# Patient Record
Sex: MALE | NOT HISPANIC OR LATINO | URBAN - METROPOLITAN AREA
[De-identification: names, ages, dates, MRNs, and addresses within clinical notes are randomized per-mention and may not be internally consistent; named-entity substitution may affect disease eponyms.]

---

## 2023-05-09 ENCOUNTER — CLAIMS CREATED FROM THE CLAIM WINDOW (OUTPATIENT)
Dept: URBAN - METROPOLITAN AREA CLINIC 33 | Facility: CLINIC | Age: 48
End: 2023-05-09
Payer: COMMERCIAL

## 2023-05-09 ENCOUNTER — LAB OUTSIDE AN ENCOUNTER (OUTPATIENT)
Dept: URBAN - METROPOLITAN AREA CLINIC 33 | Facility: CLINIC | Age: 48
End: 2023-05-09

## 2023-05-09 VITALS
HEIGHT: 78 IN | BODY MASS INDEX: 36.45 KG/M2 | DIASTOLIC BLOOD PRESSURE: 92 MMHG | SYSTOLIC BLOOD PRESSURE: 160 MMHG | WEIGHT: 315 LBS | HEART RATE: 75 BPM | OXYGEN SATURATION: 95 %

## 2023-05-09 DIAGNOSIS — R10.13 EPIGASTRIC PAIN: ICD-10-CM

## 2023-05-09 PROCEDURE — 99204 OFFICE O/P NEW MOD 45 MIN: CPT | Performed by: STUDENT IN AN ORGANIZED HEALTH CARE EDUCATION/TRAINING PROGRAM

## 2023-05-09 RX ORDER — BUPRENORPHINE 7.5 UG/H
1 PATCH TO SKIN PATCH, EXTENDED RELEASE TRANSDERMAL
Status: ACTIVE | COMMUNITY

## 2023-05-09 RX ORDER — CLONAZEPAM 0.5 MG/1
1 TABLET AT BEDTIME TABLET ORAL ONCE A DAY
Status: ACTIVE | COMMUNITY

## 2023-05-09 RX ORDER — OMEGA-3/DHA/EPA/FISH OIL 60 MG-90MG
1 CAPSULE CAPSULE ORAL TWICE A DAY
Status: ACTIVE | COMMUNITY

## 2023-05-09 RX ORDER — PANTOPRAZOLE SODIUM 40 MG/1
1 TABLET TABLET, DELAYED RELEASE ORAL ONCE A DAY
Status: ACTIVE | COMMUNITY

## 2023-05-09 NOTE — HPI-TODAY'S VISIT:
48-year-old male hx cholecystectomy (2007), hx opiate abuse (on buprenoprhine),  presents for ER follow-up for abdominal pain.  He reports symptom onset around a month ago, started developing abdominal pain, nausea, localized pain primarily epigastric. Was taking 800 mg ibuprofen TID for severe tooth pain. He was seen at [facility] on 4-.  Abdominal pain gradually started subsiding after stopped NSAIDs. He was taking PPI briefly but stopped now that pain is gone, he is concerned about possible s/e. No bleeding symptoms. No nausea/emesis. Appetite is good. No heartburn. No dysphagia. No bowel habit changes. No prior crc screening. No family hx colon cancer.  He would like to know if NSAID can be safely resumed in future given hx pain symtoms, as tylenol less effective. Discussed colon cancer screening  recommended, and discussed options including colonoscopy and cologuard. He prefers colonsocopy but he prefers to hold on this for now but willing to readdress on follow up.  Available labs/imaging/procedures reviewed:  Labs 4- WBC 8.4 Hemoglobin 12.3 Platelets 203 BUN 16 Creatinine 1.9 AST 21 ALT 36 Total bili 0.5 Alkaline phosphatase 109 Lipase 10

## 2023-05-17 ENCOUNTER — OFFICE VISIT (OUTPATIENT)
Dept: URBAN - METROPOLITAN AREA CLINIC 98 | Facility: CLINIC | Age: 48
End: 2023-05-17

## 2023-05-31 ENCOUNTER — CLAIMS CREATED FROM THE CLAIM WINDOW (OUTPATIENT)
Dept: URBAN - METROPOLITAN AREA CLINIC 4 | Facility: CLINIC | Age: 48
End: 2023-05-31
Payer: COMMERCIAL

## 2023-05-31 ENCOUNTER — OFFICE VISIT (OUTPATIENT)
Dept: URBAN - METROPOLITAN AREA SURGERY CENTER 8 | Facility: SURGERY CENTER | Age: 48
End: 2023-05-31
Payer: COMMERCIAL

## 2023-05-31 DIAGNOSIS — K29.70 GASTRITIS, UNSPECIFIED, WITHOUT BLEEDING: ICD-10-CM

## 2023-05-31 DIAGNOSIS — R10.13 ABDOMINAL DISCOMFORT, EPIGASTRIC: ICD-10-CM

## 2023-05-31 DIAGNOSIS — K29.70 CHRONIC ACITVE GASTRITIS (H.PYLORI NEGATIVE): ICD-10-CM

## 2023-05-31 DIAGNOSIS — Q39.8 CONGENITAL DUPLICATION CYST OF ESOPHAGUS: ICD-10-CM

## 2023-05-31 PROBLEM — 235651006: Status: ACTIVE | Noted: 2023-05-31

## 2023-05-31 PROCEDURE — 43239 EGD BIOPSY SINGLE/MULTIPLE: CPT | Performed by: STUDENT IN AN ORGANIZED HEALTH CARE EDUCATION/TRAINING PROGRAM

## 2023-05-31 PROCEDURE — 88312 SPECIAL STAINS GROUP 1: CPT | Performed by: PATHOLOGY

## 2023-05-31 PROCEDURE — G8907 PT DOC NO EVENTS ON DISCHARG: HCPCS | Performed by: STUDENT IN AN ORGANIZED HEALTH CARE EDUCATION/TRAINING PROGRAM

## 2023-05-31 PROCEDURE — 88305 TISSUE EXAM BY PATHOLOGIST: CPT | Performed by: PATHOLOGY

## 2023-05-31 RX ORDER — CLONAZEPAM 0.5 MG/1
1 TABLET AT BEDTIME TABLET ORAL ONCE A DAY
Status: ACTIVE | COMMUNITY

## 2023-05-31 RX ORDER — PANTOPRAZOLE SODIUM 40 MG/1
1 TABLET TABLET, DELAYED RELEASE ORAL ONCE A DAY
Qty: 30 | Refills: 11 | OUTPATIENT
Start: 2023-05-31

## 2023-05-31 RX ORDER — BUPRENORPHINE 7.5 UG/H
1 PATCH TO SKIN PATCH, EXTENDED RELEASE TRANSDERMAL
Status: ACTIVE | COMMUNITY

## 2023-05-31 RX ORDER — PANTOPRAZOLE SODIUM 40 MG/1
1 TABLET TABLET, DELAYED RELEASE ORAL ONCE A DAY
Status: ACTIVE | COMMUNITY

## 2023-05-31 RX ORDER — OMEGA-3/DHA/EPA/FISH OIL 60 MG-90MG
1 CAPSULE CAPSULE ORAL TWICE A DAY
Status: ACTIVE | COMMUNITY

## 2023-08-08 ENCOUNTER — OFFICE VISIT (OUTPATIENT)
Dept: URBAN - METROPOLITAN AREA CLINIC 31 | Facility: CLINIC | Age: 48
End: 2023-08-08

## 2023-08-11 ENCOUNTER — OFFICE VISIT (OUTPATIENT)
Dept: URBAN - METROPOLITAN AREA CLINIC 33 | Facility: CLINIC | Age: 48
End: 2023-08-11

## 2023-09-05 ENCOUNTER — OFFICE VISIT (OUTPATIENT)
Dept: URBAN - METROPOLITAN AREA CLINIC 31 | Facility: CLINIC | Age: 48
End: 2023-09-05

## 2023-09-15 ENCOUNTER — DASHBOARD ENCOUNTERS (OUTPATIENT)
Age: 48
End: 2023-09-15

## 2023-09-15 ENCOUNTER — LAB OUTSIDE AN ENCOUNTER (OUTPATIENT)
Dept: URBAN - METROPOLITAN AREA CLINIC 33 | Facility: CLINIC | Age: 48
End: 2023-09-15

## 2023-09-15 ENCOUNTER — OFFICE VISIT (OUTPATIENT)
Dept: URBAN - METROPOLITAN AREA CLINIC 33 | Facility: CLINIC | Age: 48
End: 2023-09-15
Payer: COMMERCIAL

## 2023-09-15 VITALS
SYSTOLIC BLOOD PRESSURE: 140 MMHG | HEIGHT: 78 IN | HEART RATE: 81 BPM | DIASTOLIC BLOOD PRESSURE: 80 MMHG | WEIGHT: 304.4 LBS | BODY MASS INDEX: 35.22 KG/M2 | OXYGEN SATURATION: 95 %

## 2023-09-15 DIAGNOSIS — K29.70 GASTRITIS, PRESENCE OF BLEEDING UNSPECIFIED, UNSPECIFIED CHRONICITY, UNSPECIFIED GASTRITIS TYPE: ICD-10-CM

## 2023-09-15 DIAGNOSIS — Z12.11 COLON CANCER SCREENING: ICD-10-CM

## 2023-09-15 PROBLEM — 4556007: Status: ACTIVE | Noted: 2023-09-15

## 2023-09-15 PROCEDURE — 99213 OFFICE O/P EST LOW 20 MIN: CPT | Performed by: STUDENT IN AN ORGANIZED HEALTH CARE EDUCATION/TRAINING PROGRAM

## 2023-09-15 RX ORDER — BUPRENORPHINE HCL 8 MG/1
4 TABLETS TABLET SUBLINGUAL ONCE A DAY
Status: ACTIVE | COMMUNITY

## 2023-09-15 RX ORDER — PANTOPRAZOLE SODIUM 40 MG/1
1 TABLET TABLET, DELAYED RELEASE ORAL ONCE A DAY
Qty: 30 | Refills: 11 | Status: ON HOLD | COMMUNITY
Start: 2023-05-31

## 2023-09-15 RX ORDER — OMEGA-3/DHA/EPA/FISH OIL 60 MG-90MG
1 CAPSULE CAPSULE ORAL TWICE A DAY
Status: ON HOLD | COMMUNITY

## 2023-09-15 RX ORDER — SOD SULF/POT CHLORIDE/MAG SULF 1.479 G
12 TABLETS TABLET ORAL
Qty: 24 | Refills: 0 | OUTPATIENT
Start: 2023-09-15 | End: 2023-09-16

## 2023-09-15 RX ORDER — CLONAZEPAM 0.5 MG/1
2 TABLETS AT BEDTIME TABLET ORAL ONCE A DAY
Status: ACTIVE | COMMUNITY

## 2023-09-15 NOTE — HPI-TODAY'S VISIT:
48 Year old male patient hx cholecystectomy, chronic pain, NSAID usage, present today for Endoscopy follow up for epigastric pain symptoms. Findings as below. He is also age appropriate for CRC screening. Reports took pantoprazole for 1 month post procedure. Reports nausea intermittently on pantoprazole, abdominal discomfort. Side effects improved over time. Does admit took NSAID couple times due to dental pain he reports only thing that works. he is pending oral surgery at some point but has held off. He is interest in completing colonsocopy before end of year.    Endoscopy Report Ectopic gastric mucosa in the proximal esophagus.  Biopsied. - Normal mid esophagus and distal esophagus. - Two gastric erosions possible representing partially healed ulcers.  Biopsied. - Normal cardia, gastric fundus and gastric body. - Normal duodenal bulb, first portion of the duodenum and second portion of the duodenum.  Pathology Report Stomach, Body, Biopsy: CHRONIC GASTRITIS, NON-SPECIFIC.  No Evidence of H. Pylori Organisms or Intestinal Metaplasia.  Negative for Dysplasia or Malignancy. (B) Esophagus, Upper-Third, Biopsy: GASTRIC HETEROTOPIA / INLET PATCH.  No Evidence of Easton's Esophagus or Eosinophilic Esophagitis.  Negative for Infectious organisms, Dysplasia or Malignancy.

## 2023-11-09 ENCOUNTER — OFFICE VISIT (OUTPATIENT)
Dept: URBAN - METROPOLITAN AREA SURGERY CENTER 8 | Facility: SURGERY CENTER | Age: 48
End: 2023-11-09

## 2023-12-08 ENCOUNTER — OFFICE VISIT (OUTPATIENT)
Dept: URBAN - METROPOLITAN AREA CLINIC 33 | Facility: CLINIC | Age: 48
End: 2023-12-08

## 2024-07-15 ENCOUNTER — OFFICE VISIT (OUTPATIENT)
Dept: URBAN - METROPOLITAN AREA SURGERY CENTER 8 | Facility: SURGERY CENTER | Age: 49
End: 2024-07-15

## 2024-11-08 ENCOUNTER — OFFICE VISIT (OUTPATIENT)
Dept: URBAN - METROPOLITAN AREA CLINIC 33 | Facility: CLINIC | Age: 49
End: 2024-11-08

## 2024-11-13 ENCOUNTER — OFFICE VISIT (OUTPATIENT)
Dept: URBAN - METROPOLITAN AREA CLINIC 35 | Facility: CLINIC | Age: 49
End: 2024-11-13

## 2024-11-26 ENCOUNTER — TELEPHONE ENCOUNTER (OUTPATIENT)
Dept: URBAN - METROPOLITAN AREA CLINIC 35 | Facility: CLINIC | Age: 49
End: 2024-11-26

## 2024-11-26 ENCOUNTER — OFFICE VISIT (OUTPATIENT)
Dept: URBAN - METROPOLITAN AREA CLINIC 31 | Facility: CLINIC | Age: 49
End: 2024-11-26

## 2024-11-26 RX ORDER — BUPRENORPHINE HCL 8 MG/1
4 TABLETS TABLET SUBLINGUAL ONCE A DAY
Status: ACTIVE | COMMUNITY

## 2024-11-26 RX ORDER — PANTOPRAZOLE SODIUM 40 MG/1
1 TABLET TABLET, DELAYED RELEASE ORAL ONCE A DAY
Qty: 30 | Refills: 11 | Status: ON HOLD | COMMUNITY
Start: 2023-05-31

## 2024-11-26 RX ORDER — OMEGA-3/DHA/EPA/FISH OIL 60 MG-90MG
1 CAPSULE CAPSULE ORAL TWICE A DAY
Status: ON HOLD | COMMUNITY

## 2024-11-26 RX ORDER — CLONAZEPAM 0.5 MG/1
2 TABLETS AT BEDTIME TABLET ORAL ONCE A DAY
Status: ACTIVE | COMMUNITY

## 2024-11-26 NOTE — HPI-TODAY'S VISIT:
49 year old male patient presents for follow up to rediscuss colon cancer screening, previously ordered last vist. Hx cholecystectomy, chronic pain, nsaid usage hx epigastric pain previously, with findings below.   Last Visit(09/15/2023) 48 Year old male patient hx cholecystectomy, chronic pain, NSAID usage, present today for Endoscopy follow up for epigastric pain symptoms. Findings as below. He is also age appropriate for CRC screening. Reports took pantoprazole for 1 month post procedure. Reports nausea intermittently on pantoprazole, abdominal discomfort. Side effects improved over time. Does admit took NSAID couple times due to dental pain he reports only thing that works. he is pending oral surgery at some point but has held off. He is interest in completing colonsocopy before end of year.   Endoscopy Report Ectopic gastric mucosa in the proximal esophagus.  Biopsied. - Normal mid esophagus and distal esophagus. - Two gastric erosions possible representing partially healed ulcers.  Biopsied. - Normal cardia, gastric fundus and gastric body. - Normal duodenal bulb, first portion of the duodenum and second portion of the duodenum.  Pathology Report Stomach, Body, Biopsy: CHRONIC GASTRITIS, NON-SPECIFIC.  No Evidence of H. Pylori Organisms or Intestinal Metaplasia.  Negative for Dysplasia or Malignancy. (B) Esophagus, Upper-Third, Biopsy: GASTRIC HETEROTOPIA / INLET PATCH.  No Evidence of Easton's Esophagus or Eosinophilic Esophagitis.  Negative for Infectious organisms, Dysplasia or Malignancy.

## 2024-11-27 ENCOUNTER — OFFICE VISIT (OUTPATIENT)
Dept: URBAN - METROPOLITAN AREA CLINIC 35 | Facility: CLINIC | Age: 49
End: 2024-11-27

## 2024-12-23 ENCOUNTER — OFFICE VISIT (OUTPATIENT)
Dept: URBAN - METROPOLITAN AREA SURGERY CENTER 8 | Facility: SURGERY CENTER | Age: 49
End: 2024-12-23

## 2025-06-27 ENCOUNTER — TELEPHONE ENCOUNTER (OUTPATIENT)
Dept: URBAN - METROPOLITAN AREA CLINIC 6 | Facility: CLINIC | Age: 50
End: 2025-06-27

## 2025-07-02 ENCOUNTER — OFFICE VISIT (OUTPATIENT)
Dept: URBAN - METROPOLITAN AREA CLINIC 23 | Facility: CLINIC | Age: 50
End: 2025-07-02
Payer: COMMERCIAL

## 2025-07-02 DIAGNOSIS — Z12.11 COLON CANCER SCREENING: ICD-10-CM

## 2025-07-02 PROCEDURE — 99202 OFFICE O/P NEW SF 15 MIN: CPT | Performed by: INTERNAL MEDICINE

## 2025-07-02 PROCEDURE — 99212 OFFICE O/P EST SF 10 MIN: CPT | Performed by: INTERNAL MEDICINE

## 2025-07-02 RX ORDER — OMEGA-3/DHA/EPA/FISH OIL 60 MG-90MG
1 CAPSULE CAPSULE ORAL TWICE A DAY
Status: ON HOLD | COMMUNITY

## 2025-07-02 RX ORDER — PANTOPRAZOLE SODIUM 40 MG/1
1 TABLET TABLET, DELAYED RELEASE ORAL ONCE A DAY
Qty: 30 | Refills: 11 | Status: ON HOLD | COMMUNITY
Start: 2023-05-31

## 2025-07-02 RX ORDER — CLONAZEPAM 0.5 MG/1
2 TABLETS AT BEDTIME TABLET ORAL ONCE A DAY
Status: ACTIVE | COMMUNITY

## 2025-07-02 RX ORDER — BUPRENORPHINE HCL 8 MG/1
4 TABLETS TABLET SUBLINGUAL ONCE A DAY
Status: ACTIVE | COMMUNITY

## 2025-07-02 NOTE — HPI-TODAY'S VISIT:
50M here for screening s/p CCY 2007  Never had colonoscopy. No family history of colon cancer BM everyday. No blood. No change in stool caliber No abdominal surgery  EGD 2023--mild gastritis